# Patient Record
Sex: MALE | Race: WHITE | Employment: OTHER | URBAN - METROPOLITAN AREA
[De-identification: names, ages, dates, MRNs, and addresses within clinical notes are randomized per-mention and may not be internally consistent; named-entity substitution may affect disease eponyms.]

---

## 2018-07-12 ENCOUNTER — HOSPITAL ENCOUNTER (OUTPATIENT)
Dept: GENERAL RADIOLOGY | Facility: CLINIC | Age: 68
Discharge: HOME | End: 2018-07-12
Attending: INTERNAL MEDICINE | Admitting: INTERNAL MEDICINE
Payer: MEDICARE

## 2018-07-12 DIAGNOSIS — C15.9 MALIGNANT MELANOMA OF ESOPHAGUS (H): ICD-10-CM

## 2018-07-12 DIAGNOSIS — R13.10 DYSPHAGIA, UNSPECIFIED TYPE: ICD-10-CM

## 2018-07-12 PROCEDURE — 74220 X-RAY XM ESOPHAGUS 1CNTRST: CPT

## 2018-07-12 PROCEDURE — 25500045 ZZH RX 255: Performed by: RADIOLOGY

## 2018-07-12 PROCEDURE — 40000863 ZZH STATISTIC RADIOLOGY XRAY, US, CT, MAR, NM

## 2018-07-12 RX ADMIN — ANTACID/ANTIFLATULENT 4 G: 380; 550; 10; 10 GRANULE, EFFERVESCENT ORAL at 09:58

## 2018-07-12 NOTE — PROGRESS NOTES
Called to assess pt in radiology for right hand skin tear that occurred when pt was getting up from xray table. Wound bleeding slightly.  2x2 gauze and tegaderm applied and pt instructed to keep area clean and may remove dressing tomorrow or change and keep area covered. Pt appears to accept and understand.